# Patient Record
(demographics unavailable — no encounter records)

---

## 2024-10-16 NOTE — PHYSICAL EXAM
[Chaperone Present] : A chaperone was present in the examining room during all aspects of the physical examination [97206] : A chaperone was present during the pelvic exam. [Absent] : Adnexa(ae): Absent [Normal] : Anus and perineum: Normal sphincter tone, no masses, no prolapse. [Fully active, able to carry on all pre-disease performance without restriction] : Status 0 - Fully active, able to carry on all pre-disease performance without restriction

## 2024-10-16 NOTE — HISTORY OF PRESENT ILLNESS
[FreeTextEntry1] : Problem: 1) Ovarian cyst 2) h/o hysterectomy, RSO  Previous Therapies: 1) [facility] 24 in [place]    a) Left ovarian cyst 3.5x2.7x3.7cm with papillation 2) CT C/A/P 24   a) left adnexal cyst measuring 3.3 cm    b) moderate pulmonary fibrosis/UIP pattern. 3) S/P LSO 24   a) benign  76yo s/p RA-LSO here for 1 month post-op visit. Feeling well.   History: ObHx:  x5 GynHx: -Denies h/o abnormal Pap. Last Pap 2023 PMH: Idiopathic pulmonary fibrosis, hypertension, IBS PSH: Hysterectomy with right salpingo-oophorectomy for endometriosis 40 years ago, appendectomy, laparoscopic cholecystectomy Meds: Pirfenidone, nibolol, lexapro, ivabradine, amlodipine Allergies: Levofloxacin (rash), codeine (vomiting) SH: Denies smoking, alcohol, or illicit drug use. Lives in [place], regularly travels to the . Retired, used to work in a restaurant. FMH: Mother thyroid cancer, father liver cancer, LEACH in several family members  Surveillance: Last mammogram:  normal Last colonoscopy: 6 years ago normal

## 2024-10-16 NOTE — DISCUSSION/SUMMARY
[Reviewed Clinical Lab Test(s)] : Results of clinical tests were reviewed. [Diagnosis/Stage ___] : Given this data, a diagnosis of [unfilled] is rendered. [FreeTextEntry1] : s/p RA-LSO Meeting post-op milestones  [] post-op precautions given